# Patient Record
Sex: MALE | Race: OTHER | HISPANIC OR LATINO
[De-identification: names, ages, dates, MRNs, and addresses within clinical notes are randomized per-mention and may not be internally consistent; named-entity substitution may affect disease eponyms.]

---

## 2021-03-27 ENCOUNTER — TRANSCRIPTION ENCOUNTER (OUTPATIENT)
Age: 26
End: 2021-03-27

## 2022-04-15 ENCOUNTER — EMERGENCY (EMERGENCY)
Facility: HOSPITAL | Age: 27
LOS: 1 days | Discharge: HOME | End: 2022-04-15
Attending: STUDENT IN AN ORGANIZED HEALTH CARE EDUCATION/TRAINING PROGRAM
Payer: COMMERCIAL

## 2022-04-15 VITALS
HEIGHT: 70 IN | OXYGEN SATURATION: 97 % | RESPIRATION RATE: 18 BRPM | HEART RATE: 80 BPM | TEMPERATURE: 98 F | SYSTOLIC BLOOD PRESSURE: 136 MMHG | DIASTOLIC BLOOD PRESSURE: 88 MMHG | WEIGHT: 250 LBS

## 2022-04-15 LAB
ALBUMIN SERPL ELPH-MCNC: 3.9 G/DL — SIGNIFICANT CHANGE UP (ref 3.5–5)
ALP SERPL-CCNC: 80 U/L — SIGNIFICANT CHANGE UP (ref 40–120)
ALT FLD-CCNC: 66 U/L DA — HIGH (ref 10–60)
ANION GAP SERPL CALC-SCNC: 8 MMOL/L — SIGNIFICANT CHANGE UP (ref 5–17)
APPEARANCE UR: CLEAR — SIGNIFICANT CHANGE UP
AST SERPL-CCNC: 23 U/L — SIGNIFICANT CHANGE UP (ref 10–40)
BACTERIA # UR AUTO: ABNORMAL /HPF
BASOPHILS # BLD AUTO: 0.02 K/UL — SIGNIFICANT CHANGE UP (ref 0–0.2)
BASOPHILS NFR BLD AUTO: 0.2 % — SIGNIFICANT CHANGE UP (ref 0–2)
BILIRUB SERPL-MCNC: 0.4 MG/DL — SIGNIFICANT CHANGE UP (ref 0.2–1.2)
BILIRUB UR-MCNC: NEGATIVE — SIGNIFICANT CHANGE UP
BUN SERPL-MCNC: 17 MG/DL — SIGNIFICANT CHANGE UP (ref 7–18)
CALCIUM SERPL-MCNC: 9.3 MG/DL — SIGNIFICANT CHANGE UP (ref 8.4–10.5)
CHLORIDE SERPL-SCNC: 107 MMOL/L — SIGNIFICANT CHANGE UP (ref 96–108)
CO2 SERPL-SCNC: 26 MMOL/L — SIGNIFICANT CHANGE UP (ref 22–31)
COLOR SPEC: YELLOW — SIGNIFICANT CHANGE UP
CREAT SERPL-MCNC: 0.98 MG/DL — SIGNIFICANT CHANGE UP (ref 0.5–1.3)
DIFF PNL FLD: ABNORMAL
EGFR: 108 ML/MIN/1.73M2 — SIGNIFICANT CHANGE UP
EOSINOPHIL # BLD AUTO: 0.02 K/UL — SIGNIFICANT CHANGE UP (ref 0–0.5)
EOSINOPHIL NFR BLD AUTO: 0.2 % — SIGNIFICANT CHANGE UP (ref 0–6)
EPI CELLS # UR: SIGNIFICANT CHANGE UP /HPF
GLUCOSE SERPL-MCNC: 117 MG/DL — HIGH (ref 70–99)
GLUCOSE UR QL: NEGATIVE — SIGNIFICANT CHANGE UP
HCT VFR BLD CALC: 45.6 % — SIGNIFICANT CHANGE UP (ref 39–50)
HGB BLD-MCNC: 15.4 G/DL — SIGNIFICANT CHANGE UP (ref 13–17)
IMM GRANULOCYTES NFR BLD AUTO: 0.1 % — SIGNIFICANT CHANGE UP (ref 0–1.5)
KETONES UR-MCNC: NEGATIVE — SIGNIFICANT CHANGE UP
LACTATE SERPL-SCNC: 0.7 MMOL/L — SIGNIFICANT CHANGE UP (ref 0.7–2)
LEUKOCYTE ESTERASE UR-ACNC: ABNORMAL
LIDOCAIN IGE QN: 97 U/L — SIGNIFICANT CHANGE UP (ref 73–393)
LYMPHOCYTES # BLD AUTO: 1.85 K/UL — SIGNIFICANT CHANGE UP (ref 1–3.3)
LYMPHOCYTES # BLD AUTO: 20.1 % — SIGNIFICANT CHANGE UP (ref 13–44)
MCHC RBC-ENTMCNC: 28.8 PG — SIGNIFICANT CHANGE UP (ref 27–34)
MCHC RBC-ENTMCNC: 33.8 GM/DL — SIGNIFICANT CHANGE UP (ref 32–36)
MCV RBC AUTO: 85.2 FL — SIGNIFICANT CHANGE UP (ref 80–100)
MONOCYTES # BLD AUTO: 0.62 K/UL — SIGNIFICANT CHANGE UP (ref 0–0.9)
MONOCYTES NFR BLD AUTO: 6.7 % — SIGNIFICANT CHANGE UP (ref 2–14)
NEUTROPHILS # BLD AUTO: 6.67 K/UL — SIGNIFICANT CHANGE UP (ref 1.8–7.4)
NEUTROPHILS NFR BLD AUTO: 72.7 % — SIGNIFICANT CHANGE UP (ref 43–77)
NITRITE UR-MCNC: NEGATIVE — SIGNIFICANT CHANGE UP
NRBC # BLD: 0 /100 WBCS — SIGNIFICANT CHANGE UP (ref 0–0)
PH UR: 5 — SIGNIFICANT CHANGE UP (ref 5–8)
PLATELET # BLD AUTO: 283 K/UL — SIGNIFICANT CHANGE UP (ref 150–400)
POTASSIUM SERPL-MCNC: 4 MMOL/L — SIGNIFICANT CHANGE UP (ref 3.5–5.3)
POTASSIUM SERPL-SCNC: 4 MMOL/L — SIGNIFICANT CHANGE UP (ref 3.5–5.3)
PROT SERPL-MCNC: 8.1 G/DL — SIGNIFICANT CHANGE UP (ref 6–8.3)
PROT UR-MCNC: NEGATIVE — SIGNIFICANT CHANGE UP
RBC # BLD: 5.35 M/UL — SIGNIFICANT CHANGE UP (ref 4.2–5.8)
RBC # FLD: 13.2 % — SIGNIFICANT CHANGE UP (ref 10.3–14.5)
RBC CASTS # UR COMP ASSIST: ABNORMAL /HPF (ref 0–2)
SODIUM SERPL-SCNC: 141 MMOL/L — SIGNIFICANT CHANGE UP (ref 135–145)
SP GR SPEC: 1.01 — SIGNIFICANT CHANGE UP (ref 1.01–1.02)
UROBILINOGEN FLD QL: NEGATIVE — SIGNIFICANT CHANGE UP
WBC # BLD: 9.19 K/UL — SIGNIFICANT CHANGE UP (ref 3.8–10.5)
WBC # FLD AUTO: 9.19 K/UL — SIGNIFICANT CHANGE UP (ref 3.8–10.5)
WBC UR QL: ABNORMAL /HPF (ref 0–5)

## 2022-04-15 PROCEDURE — 85025 COMPLETE CBC W/AUTO DIFF WBC: CPT

## 2022-04-15 PROCEDURE — 83690 ASSAY OF LIPASE: CPT

## 2022-04-15 PROCEDURE — 99284 EMERGENCY DEPT VISIT MOD MDM: CPT

## 2022-04-15 PROCEDURE — 80053 COMPREHEN METABOLIC PANEL: CPT

## 2022-04-15 PROCEDURE — 36415 COLL VENOUS BLD VENIPUNCTURE: CPT

## 2022-04-15 PROCEDURE — 87086 URINE CULTURE/COLONY COUNT: CPT

## 2022-04-15 PROCEDURE — 96374 THER/PROPH/DIAG INJ IV PUSH: CPT

## 2022-04-15 PROCEDURE — 83605 ASSAY OF LACTIC ACID: CPT

## 2022-04-15 PROCEDURE — 99284 EMERGENCY DEPT VISIT MOD MDM: CPT | Mod: 25

## 2022-04-15 PROCEDURE — 81001 URINALYSIS AUTO W/SCOPE: CPT

## 2022-04-15 RX ORDER — CEFPODOXIME PROXETIL 100 MG
1 TABLET ORAL
Qty: 20 | Refills: 0
Start: 2022-04-15 | End: 2022-04-24

## 2022-04-15 RX ORDER — KETOROLAC TROMETHAMINE 30 MG/ML
15 SYRINGE (ML) INJECTION ONCE
Refills: 0 | Status: DISCONTINUED | OUTPATIENT
Start: 2022-04-15 | End: 2022-04-15

## 2022-04-15 RX ORDER — SODIUM CHLORIDE 9 MG/ML
1000 INJECTION INTRAMUSCULAR; INTRAVENOUS; SUBCUTANEOUS ONCE
Refills: 0 | Status: COMPLETED | OUTPATIENT
Start: 2022-04-15 | End: 2022-04-15

## 2022-04-15 RX ORDER — SODIUM CHLORIDE 9 MG/ML
3 INJECTION INTRAMUSCULAR; INTRAVENOUS; SUBCUTANEOUS EVERY 8 HOURS
Refills: 0 | Status: DISCONTINUED | OUTPATIENT
Start: 2022-04-15 | End: 2022-04-18

## 2022-04-15 RX ORDER — CEFTRIAXONE 500 MG/1
1000 INJECTION, POWDER, FOR SOLUTION INTRAMUSCULAR; INTRAVENOUS ONCE
Refills: 0 | Status: COMPLETED | OUTPATIENT
Start: 2022-04-15 | End: 2022-04-15

## 2022-04-15 RX ADMIN — CEFTRIAXONE 1000 MILLIGRAM(S): 500 INJECTION, POWDER, FOR SOLUTION INTRAMUSCULAR; INTRAVENOUS at 04:15

## 2022-04-15 RX ADMIN — SODIUM CHLORIDE 1000 MILLILITER(S): 9 INJECTION INTRAMUSCULAR; INTRAVENOUS; SUBCUTANEOUS at 02:44

## 2022-04-15 RX ADMIN — CEFTRIAXONE 100 MILLIGRAM(S): 500 INJECTION, POWDER, FOR SOLUTION INTRAMUSCULAR; INTRAVENOUS at 04:15

## 2022-04-15 NOTE — ED ADULT NURSE NOTE - NSIMPLEMENTINTERV_GEN_ALL_ED
Implemented All Universal Safety Interventions:  [place] to call system. Call bell, personal items and telephone within reach. Instruct patient to call for assistance. Room bathroom lighting operational. Non-slip footwear when patient is off stretcher. Physically safe environment: no spills, clutter or unnecessary equipment. Stretcher in lowest position, wheels locked, appropriate side rails in place.

## 2022-04-15 NOTE — ED PROVIDER NOTE - NSFOLLOWUPCLINICS_GEN_ALL_ED_FT
[facility]  Urology  [address] [place] [address]  Phone: [phone]  Fax: [phone]  Follow Up Time: 4-6 Days

## 2022-04-15 NOTE — ED PROVIDER NOTE - CLINICAL SUMMARY MEDICAL DECISION MAKING FREE TEXT BOX
Patient p/w hematuria with benign exam. Will get UA. Patient stable and will reassess. Patient p/w hematuria with benign exam. Will get UA. Patient stable and will reassess.    Labs unremarkable. UA concerning for UTI. On reassessment pt states that he is feeling well with resolution of the L mid back pain. Pt states that this pain has been a longer term issue and preceding any urinary symptoms. No CVS tenderness and no recent ABx. Rx ABx and rec  f/u ASAP. Most likely cystitis vs other non emergent etiology of symptoms- the details of the case, history, and exam make more emergent diagnoses much less likely. Discussed with pt my clinical impression and results, patient given strict return precautions if persistent or worsening of symptoms occurs, and need for close follow up. Pt expressed understanding and agrees with plan. Pt is well appearing with a reassuring exam. Discharge home with PMD or Specialist f/u within 5 days.

## 2022-04-15 NOTE — ED PROVIDER NOTE - OBJECTIVE STATEMENT
25 y/o male with history of UTI, bladder diverticulum, heart murmur, no history of kidney stones, p/w hematuria (bright red) and mid left back pain with 1 episode of diarrhea today. Patient states he has had the back pain for awhile and thinks it may or may not be related. Patient denies any fever, chest pain, shortness of breath, cough, abdominal pain, nausea, vomiting, focal numbness/weakness, syncope, or any other recent illnesses and hospitalizations.

## 2022-04-15 NOTE — ED PROVIDER NOTE - NSFOLLOWUPINSTRUCTIONS_ED_ALL_ED_FT
You were seen in the emergency room today for urinary symptoms and found to have signs of a urinary infection. Please see the Urology doctors at the next available appointment. Please call your primary doctor to inform them of this ER visit and obtain the next available appointment within the next 5 days. As we discussed, return to the ER if you have any worsening symptoms.    We no longer feel that you need further emergency care or admission to the hospital at this time.    While we have determined that you are currently stable for discharge, we know that things can change. Please seek immediate medical attention or return to the ER if you experience any of the following:  Any worsening or persistent symptoms  Severe Pain  Chest Pain  Difficulty Breathing  Bleeding  Passing Out  Severe Rash  Inability to Eat or Drink  Persistent Fever    Please see a primary care doctor or specialist within 5 days to ensure that you are improving.    Please call the [employer] phone numbers on this document if you have any problems obtaining a follow up appointment.    I wish you well! -Dr Soriano

## 2022-04-15 NOTE — ED ADULT NURSE NOTE - OBJECTIVE STATEMENT
c/o blood in the urine and left flank radiating to back pain started at 12 midnight. Denies any other complaints.

## 2022-04-15 NOTE — ED PROVIDER NOTE - PATIENT PORTAL LINK FT
You can access the FollowMyHealth Patient Portal offered by [facility] by registering at the following website: [email or web address]. By joining [employer]’s FollowMyHealth portal, you will also be able to view your health information using other applications (apps) compatible with our system.

## 2022-04-16 LAB
CULTURE RESULTS: SIGNIFICANT CHANGE UP
SPECIMEN SOURCE: SIGNIFICANT CHANGE UP

## 2022-04-27 ENCOUNTER — APPOINTMENT (OUTPATIENT)
Dept: UROLOGY | Facility: CLINIC | Age: 27
End: 2022-04-27
Payer: COMMERCIAL

## 2022-04-27 PROCEDURE — 99204 OFFICE O/P NEW MOD 45 MIN: CPT | Mod: 25

## 2022-04-27 PROCEDURE — 99406 BEHAV CHNG SMOKING 3-10 MIN: CPT

## 2022-04-27 RX ORDER — CEFPODOXIME PROXETIL 200 MG/1
200 TABLET, FILM COATED ORAL
Refills: 0 | Status: ACTIVE | COMMUNITY

## 2022-04-27 NOTE — HISTORY OF PRESENT ILLNESS
[Currently Experiencing ___] :  [unfilled] [Hematuria - Gross] : gross hematuria [None] : None [FreeTextEntry1] : Mr. Ruiz is a very pleasant 26 year old man here today for gross hematuria.\par He reports that on 04/15/22 he started urinating blood towards the end of the stream.  This persisted.\par He has a history of urinary bladder diverticulum.\par He reports that they did urine studies in the hospital.\par UA showed RBCs as well as glucose and bacteria in the urine.\par UC negative.\par Reports tingling sensation at tip of penis.\par He reports bilateral back pain that he has had for years that he always believed was musculoskeletal.\par Currently taking cefpodoxime prescribed from ED.\par He reports one sexual partner but would like STI testing today.\par Currently vapes and has done so for 8 years

## 2022-04-27 NOTE — ASSESSMENT
[FreeTextEntry1] : Mr. Ruiz is a very pleasant 26 year old man here today for gross hematuria.\par UA showed 25-50 RBCs as well as glucose and bacteria in the urine.\par UC negative.\par GC/chlamydia.\par Ureaplasma/mycoplasma.\par Syphilis.\par HIV.\par Herpes I and II.\par CT Urogram.\par -cystoscopy\par Patient was counseled on smoking cessation for 5 minutes.  We discussed various health benefits of quitting.  We discussed the potential Urologic implications of smoking, including an increased risk of bladder and upper tract urothelial carcinoma, kidney cancer, and a potential link to erectile dysfunction.\par -We discussed AUA guidelines regarding risk stratification for microscopic hematuria\par -Extensive discussion of the potential etiologies of hematuria, as well as the need to complete full work up for evaluation of cancer or other  sources of hematuria.  Patient understands and wishes to proceed.\par RTO in 3 weeks.

## 2022-04-27 NOTE — REVIEW OF SYSTEMS
[Blood in urine that you can see] : blood visible in urine [Negative] : Heme/Lymph [FreeTextEntry2] : lower back discomfort

## 2022-04-28 LAB
C TRACH RRNA SPEC QL NAA+PROBE: NOT DETECTED
HIV1+2 AB SPEC QL IA.RAPID: NONREACTIVE
HSV 1+2 IGG SER IA-IMP: NEGATIVE
HSV 1+2 IGG SER IA-IMP: POSITIVE
HSV1 IGG SER QL: 53.9 INDEX
HSV2 IGG SER QL: 0.66 INDEX
N GONORRHOEA RRNA SPEC QL NAA+PROBE: NOT DETECTED
SOURCE AMPLIFICATION: NORMAL
T PALLIDUM AB SER QL IA: NEGATIVE

## 2022-05-04 LAB
HSV1 IGM SER QL: NEGATIVE
HSV2 AB FLD-ACNC: NEGATIVE
MYCOPLASMA HOMINIS CULTURE: NEGATIVE
UREAPLASMA CULTURE: NEGATIVE

## 2022-05-10 ENCOUNTER — APPOINTMENT (OUTPATIENT)
Dept: UROLOGY | Facility: CLINIC | Age: 27
End: 2022-05-10
Payer: COMMERCIAL

## 2022-05-10 VITALS
OXYGEN SATURATION: 98 % | SYSTOLIC BLOOD PRESSURE: 136 MMHG | DIASTOLIC BLOOD PRESSURE: 90 MMHG | HEART RATE: 94 BPM | TEMPERATURE: 98.2 F | HEIGHT: 70.8 IN | WEIGHT: 247 LBS | BODY MASS INDEX: 34.58 KG/M2

## 2022-05-10 PROCEDURE — 99213 OFFICE O/P EST LOW 20 MIN: CPT | Mod: 25

## 2022-05-10 PROCEDURE — 52000 CYSTOURETHROSCOPY: CPT

## 2022-05-10 NOTE — ASSESSMENT
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

## 2022-05-10 NOTE — HISTORY OF PRESENT ILLNESS
[FreeTextEntry1] : Very pleasant 26-year-old gentleman presents for follow-up of gross hematuria.  He recently underwent a CT scan which demonstrated no kidney stones, hydronephrosis, renal masses.  STD screening was negative.  He reports no additional episodes of gross hematuria.  No other complaints.\par \par He underwent a cystoscopy today which demonstrated a right Hutch diverticulum but no other abnormalities.

## 2022-05-18 ENCOUNTER — APPOINTMENT (OUTPATIENT)
Dept: UROLOGY | Facility: CLINIC | Age: 27
End: 2022-05-18

## 2022-11-15 ENCOUNTER — APPOINTMENT (OUTPATIENT)
Dept: UROLOGY | Facility: CLINIC | Age: 27
End: 2022-11-15

## 2022-11-15 VITALS
DIASTOLIC BLOOD PRESSURE: 87 MMHG | HEIGHT: 70 IN | OXYGEN SATURATION: 99 % | SYSTOLIC BLOOD PRESSURE: 129 MMHG | TEMPERATURE: 97.2 F | HEART RATE: 90 BPM | WEIGHT: 250 LBS | BODY MASS INDEX: 35.79 KG/M2

## 2022-11-15 DIAGNOSIS — F17.210 NICOTINE DEPENDENCE, CIGARETTES, UNCOMPLICATED: ICD-10-CM

## 2022-11-15 DIAGNOSIS — R31.0 GROSS HEMATURIA: ICD-10-CM

## 2022-11-15 DIAGNOSIS — N32.3 DIVERTICULUM OF BLADDER: ICD-10-CM

## 2022-11-15 DIAGNOSIS — Z11.3 ENCOUNTER FOR SCREENING FOR INFECTIONS WITH A PREDOMINANTLY SEXUAL MODE OF TRANSMISSION: ICD-10-CM

## 2022-11-15 PROCEDURE — 99213 OFFICE O/P EST LOW 20 MIN: CPT

## 2022-11-15 NOTE — HISTORY OF PRESENT ILLNESS
[FreeTextEntry1] : Very pleasant 26-year-old gentleman presents for follow-up of gross hematuria.  He previously underwent a CT scan which demonstrated no kidney stones, hydronephrosis, or renal masses.  Additionally, he underwent a cystoscopy which demonstrated a right Hutch diverticulum but no other abnormalities.  STD screening was negative in the past.  He reports no additional episodes of gross hematuria over the last 6 months.  No other complaints.\par \par

## 2022-11-15 NOTE — ASSESSMENT
[FreeTextEntry1] : Very pleasant 27-year-old gentleman who presents for follow-up of hematuria, right Hutch diverticulum, screening for STDs.\par -Urinalysis\par -Urine culture\par -Urine cytology\par -Follow-up in 1 year if urine studies are negative

## 2022-11-16 LAB
APPEARANCE: CLEAR
BACTERIA: NEGATIVE
BILIRUBIN URINE: NEGATIVE
BLOOD URINE: NEGATIVE
COLOR: YELLOW
GLUCOSE QUALITATIVE U: NEGATIVE
HYALINE CASTS: 1 /LPF
KETONES URINE: NEGATIVE
LEUKOCYTE ESTERASE URINE: ABNORMAL
MICROSCOPIC-UA: NORMAL
NITRITE URINE: NEGATIVE
PH URINE: 6
PROTEIN URINE: NORMAL
RED BLOOD CELLS URINE: 2 /HPF
SPECIFIC GRAVITY URINE: 1.03
SQUAMOUS EPITHELIAL CELLS: 1 /HPF
URINE CYTOLOGY: NORMAL
UROBILINOGEN URINE: NORMAL
WHITE BLOOD CELLS URINE: 8 /HPF

## 2022-11-17 LAB — BACTERIA UR CULT: NORMAL

## 2023-01-30 ENCOUNTER — EMERGENCY (EMERGENCY)
Facility: HOSPITAL | Age: 28
LOS: 1 days | Discharge: HOME | End: 2023-01-30
Attending: EMERGENCY MEDICINE | Admitting: EMERGENCY MEDICINE
Payer: COMMERCIAL

## 2023-01-30 VITALS
SYSTOLIC BLOOD PRESSURE: 151 MMHG | TEMPERATURE: 98 F | RESPIRATION RATE: 19 BRPM | OXYGEN SATURATION: 100 % | DIASTOLIC BLOOD PRESSURE: 80 MMHG | HEART RATE: 86 BPM

## 2023-01-30 PROCEDURE — 99285 EMERGENCY DEPT VISIT HI MDM: CPT

## 2023-01-30 NOTE — ED ADULT TRIAGE NOTE - CHIEF COMPLAINT QUOTE
Pt c/o right shoulder pain s/p falling while ice skating thursday. Denies current numbness, weakness. + sensation. Denies PMHX/Meds. Pt c/o right shoulder pain s/p falling while ice skating thursday. Denies current numbness, weakness. + sensation, + pulses. Denies PMHX/Meds.

## 2023-01-31 VITALS
SYSTOLIC BLOOD PRESSURE: 127 MMHG | DIASTOLIC BLOOD PRESSURE: 68 MMHG | TEMPERATURE: 99 F | OXYGEN SATURATION: 100 % | RESPIRATION RATE: 20 BRPM | HEART RATE: 62 BPM

## 2023-01-31 PROCEDURE — 73110 X-RAY EXAM OF WRIST: CPT | Mod: 26,LT

## 2023-01-31 PROCEDURE — 73090 X-RAY EXAM OF FOREARM: CPT | Mod: 26,LT

## 2023-01-31 PROCEDURE — 73030 X-RAY EXAM OF SHOULDER: CPT | Mod: 26,LT

## 2023-01-31 PROCEDURE — 73080 X-RAY EXAM OF ELBOW: CPT | Mod: 26,LT

## 2023-01-31 PROCEDURE — 73070 X-RAY EXAM OF ELBOW: CPT | Mod: 26,59,LT

## 2023-01-31 PROCEDURE — 73060 X-RAY EXAM OF HUMERUS: CPT | Mod: 26,LT

## 2023-01-31 RX ORDER — IBUPROFEN 200 MG
600 TABLET ORAL ONCE
Refills: 0 | Status: COMPLETED | OUTPATIENT
Start: 2023-01-31 | End: 2023-01-31

## 2023-01-31 RX ADMIN — Medication 600 MILLIGRAM(S): at 01:05

## 2023-01-31 NOTE — ED PROVIDER NOTE - OBJECTIVE STATEMENT
19-year-old male with no significant past medical history.  Patient presents to ED with left arm pain after FOOSH injury sustained while ice skating yesterday.  There is no head trauma or LOC noted.  Patient notes pain is mainly in the elbow and radiating up towards the shoulder.  Patient denies any numbness or tingling.  There is no headache or neck pain.  Patient last took NSAIDs at 7 AM.  Patient placed arm in sling after and used ice.  Patient is right-handed.

## 2023-01-31 NOTE — ED PROVIDER NOTE - PHYSICAL EXAMINATION
Left upper extremity: There is some mild tenderness to the anterior left shoulder but no clavicular tenderness noted.  Patient has good range of motion of shoulder with some pain.  There is no tenderness along the humerus.  There is significant tenderness at the elbow mainly in the antecubital crease and medial epicondyle.  Patient is most tender in the groove between the medial epicondyle and olecranon.  Patient has no significant forearm tenderness.  Left wrist and hand are unremarkable.  Sensation and cap refill is normal distally.  There is good range of motion of the elbow but it is slightly limited due to pain and patient is unable to extend it fully.

## 2023-01-31 NOTE — CONSULT NOTE ADULT - SUBJECTIVE AND OBJECTIVE BOX
HPI: 27y year old Male RHD  p/w L elbow pain s/p fall while skating.  Landed on outstretched hand.  Patient had difficulty moving L elbow after fall.  Patient denies pain in any other joint, numbness, tingling, paresthesias. No pain in any other extremities.  No Head trauma or LOC.    PMH:  Bladder diverticulum    Frequent UTI      [ ] No past medical history    PSH:  No significant past surgical history      [ ] No past surgical history    Allergies:  No Known Allergies      O:  T(C): 37 (01-31-23 @ 02:09), Max: 37 (01-31-23 @ 02:09)  HR: 62 (01-31-23 @ 02:09) (62 - 86)  BP: 127/68 (01-31-23 @ 02:09) (127/68 - 151/80)  RR: 20 (01-31-23 @ 02:09) (19 - 20)  SpO2: 100% (01-31-23 @ 02:09) (100% - 100%)    Gen  LUE:  Skin intact  Swollen elbow  Full active/passive ROM 0-110  Neg valgus/varus laxity of elbow  AIN/PIN/U Intact  SILT M/U/R  Radial pulse palpable, WWP distally      RUE / B/L LE:  No bony TTP; Good ROM w/o pain. Able to SLR B/L. Exam Unremarkable.     Imaging:    Xray demonstrating L coronoid fx      A/P 27y year old Male with L coronoid fx  Pain Control  NWB LUE in posterior slab splint  Sling for comfort  F/u as outpatient in 1 week with Odonnell call [phone] for appointment

## 2023-01-31 NOTE — ED PROVIDER NOTE - PATIENT PORTAL LINK FT
You can access the FollowMyHealth Patient Portal offered by [facility] by registering at the following website: [email or web address]. By joining [employer]’s FollowMyHealth portal, you will also be able to view your health information using other applications (apps) compatible with our system. You can access the FollowMyHealth Patient Portal offered by [facility] by registering at the following website: [email or web address]. By joining [employer]’s FollowMyHealth portal, you will also be able to view your health information using other applications (apps) compatible with our system.

## 2023-01-31 NOTE — ED PROVIDER NOTE - CLINICAL SUMMARY MEDICAL DECISION MAKING FREE TEXT BOX
19-year-old male with left arm pain status post fall while ice skating yesterday.  Patient's physical exam is most concerning for elbow injury.  We will check x-rays and give NSAIDs in the meantime.  Further treatment based on x-ray results.  If there is no fracture patient can be splinted and follow-up with outpatient Ortho.  If there is a fracture will consult orthopedics.

## 2023-01-31 NOTE — ED PROVIDER NOTE - PROGRESS NOTE DETAILS
Tomer SHIN: Informed patient of acute displaced fx of ulnar coronoid process. Informed Gee Mercado MD (ortho resident) of wrist/elbow x-ray findings. Ortho resident will come see pt in ED. Tomer SHIN: Spoke with orthopedics resident who took care of the patient.  Orthopedics resident recommends follow-up with Raymond Odonnell outpatient this week.  No acute surgical interventions.  No further interventions from the ED perspective. Pt is stable and ready for discharge. Strict return precautions given. All questions answered.

## 2023-01-31 NOTE — ED PROVIDER NOTE - CARE PROVIDER_API CALL
Raymond Odonnell (MD)  Orthopaedic Surgery  [address], [address]  [place] [address]  Phone: [phone]  Fax: [phone]  Follow Up Time: 1-3 Days

## 2023-01-31 NOTE — ED PROVIDER NOTE - NSFOLLOWUPINSTRUCTIONS_ED_ALL_ED_FT
Please continue ibuprofen 600 mg every 6-8 hours with food as needed for pain.  You may use ice for 15 minutes few times a day.  Please keep left arm in sling for comfort for the next few days to 1 week.   Please follow-up with orthopedic surgeon for further treatment in the next 1 to 2 weeks.  A referral form is included in this paperwork.  Please return to the ED for any changes in your symptoms or other concerns.

## 2023-02-02 ENCOUNTER — APPOINTMENT (OUTPATIENT)
Dept: ORTHOPEDIC SURGERY | Facility: CLINIC | Age: 28
End: 2023-02-02
Payer: COMMERCIAL

## 2023-02-02 ENCOUNTER — NON-APPOINTMENT (OUTPATIENT)
Age: 28
End: 2023-02-02

## 2023-02-02 PROCEDURE — 99203 OFFICE O/P NEW LOW 30 MIN: CPT

## 2023-02-02 NOTE — HISTORY OF PRESENT ILLNESS
[de-identified] : KLAUS Young is a 27 y.o. RHD gentleman who presents to the office with a left elbow fracture sustained on 1/29/23 while ice skating. He was falling and broke his fall with his left hand. He was seen in [facility] ER on 1/31/23 and had xrays taken and was told her had an elbow fracture. He was placed in a posterior splint.  He is not in pain at this moment but had taken Advil after the fall. Denies paresthesias. He has some tenderness over the anterior aspect of his left shoulder. 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

## 2023-02-02 NOTE — PHYSICAL EXAM
[de-identified] : The patient is sitting comfortably in the exam room. \par LEFT arm.\par -Skin is intact, swelling, ecchymosis over the medial aspect of the elbow\par -Tenderness to palpation over the medial aspect over the ecchymoses\par -No pain with palpation over the radial head with range of motion\par -Range of motion elbow 15 to 90 degrees\par -Pronation 90, supination 90\par -Stable to varus and valgus stress\par -Able to make a fist\par -Sensation is intact median, radial, ulnar, axillary nerves\par -Motor is intact median, radial, ulnar, axillary nerves\par -Hand is warm and well-perfused, Palpable radial and ulnar pulses\par  [de-identified] : Xrays from [facility] on 1/31/23 show: Left coronoid fracture.  The elbow is reduced\par

## 2023-02-02 NOTE — DISCUSSION/SUMMARY
1. Labs today: cmp, cbc  (no mag or phos)  2. Schedule PET CT.  3. Follow up TBD.  4. Please print out AVS. Thanks -e [de-identified] : 27-year-old gentleman with a left elbow fracture, approximately 2 days out.\par \par -The risks and benefits of operative versus nonoperative management were discussed at length with the patient. The patient shows a good understanding of the injury and treatment options. They would like to move forward with nonoperative management at this time. \par -MRI to assess ligamentous structures medially and laterally and assess the extent of the coronoid fracture\par -Gentle range of motion exercises at home, fingers, wrist, elbow, shoulder\par -Sling for comfort.  We discussed the importance of getting the arm out of the sling so he does not get stiff\par -Tylenol and NSAIDs for pain\par -Follow-up after MRI left elbow\par -All of the patient's questions and concerns were addressed.\par

## 2023-03-07 ENCOUNTER — OUTPATIENT (OUTPATIENT)
Dept: OUTPATIENT SERVICES | Facility: HOSPITAL | Age: 28
LOS: 1 days | End: 2023-03-07
Payer: COMMERCIAL

## 2023-03-07 ENCOUNTER — APPOINTMENT (OUTPATIENT)
Dept: MRI IMAGING | Facility: CLINIC | Age: 28
End: 2023-03-07
Payer: COMMERCIAL

## 2023-03-07 DIAGNOSIS — S52.042A DISPLACED FRACTURE OF CORONOID PROCESS OF LEFT ULNA, INITIAL ENCOUNTER FOR CLOSED FRACTURE: ICD-10-CM

## 2023-03-07 PROCEDURE — 73221 MRI JOINT UPR EXTREM W/O DYE: CPT

## 2023-03-07 PROCEDURE — 73221 MRI JOINT UPR EXTREM W/O DYE: CPT | Mod: 26,LT

## 2023-03-08 ENCOUNTER — APPOINTMENT (OUTPATIENT)
Dept: ORTHOPEDIC SURGERY | Facility: CLINIC | Age: 28
End: 2023-03-08
Payer: COMMERCIAL

## 2023-03-08 PROCEDURE — 73080 X-RAY EXAM OF ELBOW: CPT | Mod: LT

## 2023-03-08 PROCEDURE — 99213 OFFICE O/P EST LOW 20 MIN: CPT

## 2023-03-09 ENCOUNTER — NON-APPOINTMENT (OUTPATIENT)
Age: 28
End: 2023-03-09

## 2023-04-19 NOTE — HISTORY OF PRESENT ILLNESS
[de-identified] : KLAUS Young is a 27 y.o. RHD gentleman who presents to the office with a left elbow fracture sustained on 1/29/23 being treated nonoperatively. Since his last visit he states he is feeling better. He notes some pain with full extension and flexion of the left arm. He does not take any medication for the pain. He does some home exercises to improve ROM. He had an MRI on 3/7/23 and is here to discuss the results.

## 2023-04-19 NOTE — DISCUSSION/SUMMARY
[de-identified] : 27-year-old gentleman with a left elbow minimally displaced coronoid fracture and partial tear of the UCL, approximately 5.5 weeks out, being treated nonoperatively.\par -Risk and benefits of operative versus nonoperative management were discussed again with the patient and the patient showed a good understanding of his injury.  The patient chose to move forward with nonoperative management.\par -Begin PT for left elbow ROM exercises\par -5 pound weight limit left UE\par -Home exercises: ROM exercises of the left elbow.  These were demonstrated in the office today.\par -Follow-up in 2 months with xrays of the left elbow\par -All of the patient's questions and concerns were addressed.\par

## 2023-04-19 NOTE — PHYSICAL EXAM
[de-identified] : The patient is sitting comfortably in the exam room. \par LEFT arm.\par -Skin is intact, swelling, ecchymosis over the medial aspect of the elbow\par -Tenderness to palpation over the medial aspect over the ecchymoses\par -No pain with palpation over the radial head with range of motion\par -Range of motion elbow 5 to 120 degrees\par -Pronation 90, supination 90\par -Stable to varus and valgus stress\par -Able to make a fist\par -Sensation is intact median, radial, ulnar, axillary nerves\par -Motor is intact median, radial, ulnar, axillary nerves\par -Hand is warm and well-perfused, Palpable radial and ulnar pulses\par  [de-identified] : Xrays of the left elbow were taken in the office today, 3/8/23.  AP, oblique, radial head view, lateral.  X-rays show good overall alignment of the elbow.  The elbow is reduced.  The radiocapitellar joint is well aligned.  The coronoid fracture is difficult to appreciate due to quality of the lateral x-ray\par \par MRI on [facility] 3/7/23\par \par Impression:\par \par Mildly displaced intra-articular fracture of the anterior/anteromedial coronoid process of the proximal ulna with extension of fracture lines into the region of the insertion of the ulnar collateral ligament complex. Partial tearing of the ulnar collateral ligament, as above. Partial tear with scar remodeling of the ulnar attachment of the lateral ulnar collateral ligament. Findings likely represent a posteromedial rotatory instability type injury pattern.\par \par Bone contusions along the lateral epicondyle, posterolateral trochlea, and lateral olecranon.\par \par Increased T2 signal with mild thickening of the ulnar nerve just proximal to and at the level of the cubital tunnel, which may represent ulnar neuropathy. No compressive mass or fluid collection seen.\par \par Small joint effusion.

## 2023-05-04 ENCOUNTER — APPOINTMENT (OUTPATIENT)
Dept: ORTHOPEDIC SURGERY | Facility: CLINIC | Age: 28
End: 2023-05-04
Payer: COMMERCIAL

## 2023-05-04 DIAGNOSIS — S52.042A DISPLACED FRACTURE OF CORONOID PROCESS OF LEFT ULNA, INITIAL ENCOUNTER FOR CLOSED FRACTURE: ICD-10-CM

## 2023-05-04 PROCEDURE — 99213 OFFICE O/P EST LOW 20 MIN: CPT

## 2023-05-04 PROCEDURE — 73080 X-RAY EXAM OF ELBOW: CPT | Mod: LT

## 2023-08-15 NOTE — HISTORY OF PRESENT ILLNESS
[de-identified] : KLAUS Young is a 27 y.o. RHD gentleman who presents to the office with a left coronoid fracture sustained on 1/29/23 being treated nonoperatively. Since his last visit he states he is feeling better. He is working out at the gym and is happy with his progress. He would like to return to work with no restrictions. \par \par He works in IT.

## 2023-08-15 NOTE — PHYSICAL EXAM
[de-identified] : The patient is sitting comfortably in the exam room. \par LEFT arm.\par -Skin is intact, swelling, ecchymosis over the medial aspect of the elbow\par -Tenderness to palpation over the medial aspect over the ecchymoses\par -No pain with palpation over the radial head with range of motion\par -Range of motion elbow 5 to 120 degrees\par -Pronation 90, supination 90\par -Stable to varus and valgus stress\par -Able to make a fist\par -Sensation is intact median, radial, ulnar, axillary nerves\par -Motor is intact median, radial, ulnar, axillary nerves\par -Hand is warm and well-perfused, Palpable radial and ulnar pulses\par  [de-identified] : Xrays of the left elbow were taken in the office today, 5/4/23.  AP, oblique, radial head view, lateral.  X-rays show good overall alignment of the elbow.  The elbow is reduced.  The radiocapitellar joint is well aligned.  The coronoid fracture is difficult to appreciate

## 2023-08-15 NOTE — DISCUSSION/SUMMARY
[de-identified] : 27-year-old gentleman with a left elbow minimally displaced coronoid fracture and partial tear of the UCL, approximately 3 months out, being treated nonoperatively. -X-ray and physical exam findings were discussed with the patient -Physical Therapy for left elbow ROM exercises -Follow-up in 2 months with xrays of the left elbow -All of the patient's questions and concerns were addressed.

## 2023-11-17 ENCOUNTER — APPOINTMENT (OUTPATIENT)
Dept: UROLOGY | Facility: CLINIC | Age: 28
End: 2023-11-17